# Patient Record
(demographics unavailable — no encounter records)

---

## 2024-10-31 NOTE — ASSESSMENT
[FreeTextEntry1] : Health Maintenance His BMI is good and no weight loss is currently recommended. Daily aerobic exercises strongly recommended. No STD risk or substance abuse per patient report. Occasional gender specific self-examination is suggested. HIV antibody sent with patient approval. No depression. Competent with ADLs. Colonoscopy is not due this year. Completed primary COVID-vaccine series but declined any boosters or updated variant specific vaccine. 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

## 2024-10-31 NOTE — HEALTH RISK ASSESSMENT
[Good] : ~his/her~  mood as  good [Never (0 pts)] : Never (0 points) [No] : In the past 12 months have you used drugs other than those required for medical reasons? No [No falls in past year] : Patient reported no falls in the past year [0] : 2) Feeling down, depressed, or hopeless: Not at all (0) [PHQ-2 Negative - No further assessment needed] : PHQ-2 Negative - No further assessment needed [HIV Test offered] : HIV Test offered [Hepatitis C test declined] : Hepatitis C test declined [Alone] : lives alone [Employed] : employed [Single] : single [Sexually Active] : sexually active [Fully functional (bathing, dressing, toileting, transferring, walking, feeding)] : Fully functional (bathing, dressing, toileting, transferring, walking, feeding) [Fully functional (using the telephone, shopping, preparing meals, housekeeping, doing laundry, using] : Fully functional and needs no help or supervision to perform IADLs (using the telephone, shopping, preparing meals, housekeeping, doing laundry, using transportation, managing medications and managing finances) [Reports normal functional visual acuity (ie: able to read med bottle)] : Reports normal functional visual acuity [Patient/Caregiver not ready to engage] : , patient/caregiver not ready to engage [Never] : Never [NO] : No [Audit-CScore] : 0 [[id number]] : 0 [Change in mental status noted] : No change in mental status noted [High Risk Behavior] : no high risk behavior [Reports changes in hearing] : Reports no changes in hearing [Reports changes in vision] : Reports no changes in vision [Reports changes in dental health] : Reports no changes in dental health [TB Exposure] : is not being exposed to tuberculosis [AdvancecareDate] : 10/2024

## 2024-10-31 NOTE — HISTORY OF PRESENT ILLNESS
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 [de-identified] : Patient feels very well without any complaints at this time and is not on any prescription medication. Mainly request blood work including STDs.

## 2025-03-13 NOTE — ASSESSMENT
[FreeTextEntry1] : Possible low testosterone Extended discussion regarding testosterone levels and indications for replacement therapy. Patient advised that his complaints do not strongly support diagnosis of hypogonadism and that, in absence of obesity or other typically associated conditions, this diagnosis does not seem likely.  However, testosterone levels would be necessary to determine for sure.  They should be taken as early in the morning as possible. Patient states that he is able to come in for lab visit tomorrow morning at 9:00.  Full testosterone panel ordered for that time.  Further discussion once results are available. Patient advised that, if his level is normal, "extra" testosterone would not be indicated (due to risk of side effects v benefits).  If his testosterone level is very low, will refer to urology for further evaluation.  If his testosterone is slightly low, will follow-up with second level in 2 to 3 months (as testosterone levels can be very volatile) before making determination regarding treatment. Patient indicates understanding and agreement with the above.  He will come in for lab blood work tomorrow morning.

## 2025-03-13 NOTE — HISTORY OF PRESENT ILLNESS
[Home] : at home, [unfilled] , at the time of the visit. [Medical Office: ([facility])___] : at the medical office located in  [Telephone (audio)] : This telephonic visit was provided via audio only technology. [Verbal consent obtained from patient] : the patient, [unfilled] [FreeTextEntry1] : Concern for low testosterone [de-identified] : Patient expresses concern that his testosterone may be low and that he might need "extra "testosterone injections. On questioning, he complains of decreased libido although no effect on erectile function or overall energy level. States has never been diagnosed with hypogonadism in the past.  He just feels that extra testosterone would be useful for him.